# Patient Record
Sex: FEMALE | Race: WHITE | NOT HISPANIC OR LATINO | Employment: FULL TIME | URBAN - METROPOLITAN AREA
[De-identification: names, ages, dates, MRNs, and addresses within clinical notes are randomized per-mention and may not be internally consistent; named-entity substitution may affect disease eponyms.]

---

## 2023-04-26 LAB
ALANINE AMINOTRANSFERASE (SGPT) (U/L) IN SER/PLAS: 23 U/L (ref 7–45)
ALBUMIN (G/DL) IN SER/PLAS: 5.2 G/DL (ref 3.4–5)
ALKALINE PHOSPHATASE (U/L) IN SER/PLAS: 52 U/L (ref 33–110)
ANION GAP IN SER/PLAS: 13 MMOL/L (ref 10–20)
ASPARTATE AMINOTRANSFERASE (SGOT) (U/L) IN SER/PLAS: 16 U/L (ref 9–39)
BILIRUBIN TOTAL (MG/DL) IN SER/PLAS: 1 MG/DL (ref 0–1.2)
CALCIDIOL (25 OH VITAMIN D3) (NG/ML) IN SER/PLAS: 33 NG/ML
CALCIUM (MG/DL) IN SER/PLAS: 10.4 MG/DL (ref 8.6–10.6)
CARBON DIOXIDE, TOTAL (MMOL/L) IN SER/PLAS: 24 MMOL/L (ref 21–32)
CHLORIDE (MMOL/L) IN SER/PLAS: 104 MMOL/L (ref 98–107)
CHOLESTEROL (MG/DL) IN SER/PLAS: 182 MG/DL (ref 0–199)
CHOLESTEROL IN HDL (MG/DL) IN SER/PLAS: 66.4 MG/DL
CHOLESTEROL/HDL RATIO: 2.7
CREATININE (MG/DL) IN SER/PLAS: 0.49 MG/DL (ref 0.5–1.05)
GFR FEMALE: >90 ML/MIN/1.73M2
GLUCOSE (MG/DL) IN SER/PLAS: 77 MG/DL (ref 74–99)
LDL: 101 MG/DL (ref 0–119)
POTASSIUM (MMOL/L) IN SER/PLAS: 4.1 MMOL/L (ref 3.5–5.3)
PROTEIN TOTAL: 7.8 G/DL (ref 6.4–8.2)
SODIUM (MMOL/L) IN SER/PLAS: 137 MMOL/L (ref 136–145)
TRIGLYCERIDE (MG/DL) IN SER/PLAS: 72 MG/DL (ref 0–149)
UREA NITROGEN (MG/DL) IN SER/PLAS: 15 MG/DL (ref 6–23)
VLDL: 14 MG/DL (ref 0–40)

## 2023-08-29 LAB
ERYTHROCYTE DISTRIBUTION WIDTH (RATIO) BY AUTOMATED COUNT: 14.1 % (ref 11.5–14.5)
ERYTHROCYTE MEAN CORPUSCULAR HEMOGLOBIN CONCENTRATION (G/DL) BY AUTOMATED: 31.6 G/DL (ref 32–36)
ERYTHROCYTE MEAN CORPUSCULAR VOLUME (FL) BY AUTOMATED COUNT: 92 FL (ref 80–100)
ERYTHROCYTES (10*6/UL) IN BLOOD BY AUTOMATED COUNT: 4.35 X10E12/L (ref 4–5.2)
HEMATOCRIT (%) IN BLOOD BY AUTOMATED COUNT: 40.2 % (ref 36–46)
HEMOGLOBIN (G/DL) IN BLOOD: 12.7 G/DL (ref 12–16)
HEPATITIS B VIRUS SURFACE AG PRESENCE IN SERUM: NONREACTIVE
LEUKOCYTES (10*3/UL) IN BLOOD BY AUTOMATED COUNT: 7.7 X10E9/L (ref 4.4–11.3)
NRBC (PER 100 WBCS) BY AUTOMATED COUNT: 0 /100 WBC (ref 0–0)
PLATELETS (10*3/UL) IN BLOOD AUTOMATED COUNT: 254 X10E9/L (ref 150–450)
REFLEX ADDED, ANEMIA PANEL: ABNORMAL

## 2023-08-30 LAB
ABO GROUP (TYPE) IN BLOOD: NORMAL
ANTIBODY SCREEN: NORMAL
CHLAMYDIA TRACH., AMPLIFIED: NEGATIVE
HEPATITIS C VIRUS AB PRESENCE IN SERUM: NONREACTIVE
HIV 1/ 2 AG/AB SCREEN: NONREACTIVE
N. GONORRHEA, AMPLIFIED: NEGATIVE
RH FACTOR: NORMAL
RUBELLA VIRUS IGG AB: NEGATIVE
SYPHILIS TOTAL AB: NONREACTIVE
URINE CULTURE: NORMAL

## 2023-10-21 PROBLEM — N91.1 SECONDARY AMENORRHEA: Status: ACTIVE | Noted: 2023-10-21

## 2023-10-21 PROBLEM — Z34.90 PREGNANT (HHS-HCC): Status: ACTIVE | Noted: 2023-10-21

## 2023-10-21 PROBLEM — Z3A.12 12 WEEKS GESTATION OF PREGNANCY (HHS-HCC): Status: ACTIVE | Noted: 2023-10-21

## 2023-10-21 PROBLEM — L70.9 ACNE: Status: ACTIVE | Noted: 2023-10-21

## 2023-10-24 ENCOUNTER — ROUTINE PRENATAL (OUTPATIENT)
Dept: OBSTETRICS AND GYNECOLOGY | Facility: CLINIC | Age: 28
End: 2023-10-24
Payer: COMMERCIAL

## 2023-10-24 VITALS — DIASTOLIC BLOOD PRESSURE: 54 MMHG | BODY MASS INDEX: 19.46 KG/M2 | SYSTOLIC BLOOD PRESSURE: 102 MMHG | WEIGHT: 103 LBS

## 2023-10-24 DIAGNOSIS — Z3A.16 16 WEEKS GESTATION OF PREGNANCY (HHS-HCC): ICD-10-CM

## 2023-10-24 DIAGNOSIS — Z34.02 ENCOUNTER FOR SUPERVISION OF NORMAL FIRST PREGNANCY IN SECOND TRIMESTER (HHS-HCC): ICD-10-CM

## 2023-10-24 DIAGNOSIS — Z36.3 SCREENING, ANTENATAL, FOR MALFORMATION BY ULTRASOUND (HHS-HCC): ICD-10-CM

## 2023-10-24 PROCEDURE — 0501F PRENATAL FLOW SHEET: CPT | Performed by: OBSTETRICS & GYNECOLOGY

## 2023-11-21 ENCOUNTER — ROUTINE PRENATAL (OUTPATIENT)
Dept: OBSTETRICS AND GYNECOLOGY | Facility: CLINIC | Age: 28
End: 2023-11-21
Payer: COMMERCIAL

## 2023-11-21 ENCOUNTER — ANCILLARY PROCEDURE (OUTPATIENT)
Dept: RADIOLOGY | Facility: CLINIC | Age: 28
End: 2023-11-21
Payer: COMMERCIAL

## 2023-11-21 ENCOUNTER — APPOINTMENT (OUTPATIENT)
Dept: RADIOLOGY | Facility: CLINIC | Age: 28
End: 2023-11-21
Payer: COMMERCIAL

## 2023-11-21 VITALS — WEIGHT: 107 LBS | BODY MASS INDEX: 20.22 KG/M2 | SYSTOLIC BLOOD PRESSURE: 110 MMHG | DIASTOLIC BLOOD PRESSURE: 62 MMHG

## 2023-11-21 DIAGNOSIS — Z3A.20 20 WEEKS GESTATION OF PREGNANCY (HHS-HCC): ICD-10-CM

## 2023-11-21 DIAGNOSIS — Z36.3 SCREENING, ANTENATAL, FOR MALFORMATION BY ULTRASOUND (HHS-HCC): ICD-10-CM

## 2023-11-21 DIAGNOSIS — Z34.02 ENCOUNTER FOR SUPERVISION OF NORMAL FIRST PREGNANCY IN SECOND TRIMESTER (HHS-HCC): ICD-10-CM

## 2023-11-21 PROCEDURE — 0501F PRENATAL FLOW SHEET: CPT | Performed by: OBSTETRICS & GYNECOLOGY

## 2023-11-21 PROCEDURE — 76805 OB US >/= 14 WKS SNGL FETUS: CPT | Performed by: OBSTETRICS & GYNECOLOGY

## 2024-01-02 ENCOUNTER — ROUTINE PRENATAL (OUTPATIENT)
Dept: OBSTETRICS AND GYNECOLOGY | Facility: CLINIC | Age: 29
End: 2024-01-02
Payer: COMMERCIAL

## 2024-01-02 VITALS — BODY MASS INDEX: 22.11 KG/M2 | DIASTOLIC BLOOD PRESSURE: 54 MMHG | WEIGHT: 117 LBS | SYSTOLIC BLOOD PRESSURE: 92 MMHG

## 2024-01-02 DIAGNOSIS — Z34.02 ENCOUNTER FOR SUPERVISION OF NORMAL FIRST PREGNANCY IN SECOND TRIMESTER (HHS-HCC): ICD-10-CM

## 2024-01-02 DIAGNOSIS — Z3A.26 26 WEEKS GESTATION OF PREGNANCY (HHS-HCC): ICD-10-CM

## 2024-01-02 LAB
ERYTHROCYTE [DISTWIDTH] IN BLOOD BY AUTOMATED COUNT: 13.6 % (ref 11.5–14.5)
GLUCOSE 1H P 50 G GLC PO SERPL-MCNC: 96 MG/DL
HCT VFR BLD AUTO: 34.7 % (ref 36–46)
HGB BLD-MCNC: 11.4 G/DL (ref 12–16)
MCH RBC QN AUTO: 29.8 PG (ref 26–34)
MCHC RBC AUTO-ENTMCNC: 32.9 G/DL (ref 32–36)
MCV RBC AUTO: 91 FL (ref 80–100)
NRBC BLD-RTO: 0 /100 WBCS (ref 0–0)
PLATELET # BLD AUTO: 227 X10*3/UL (ref 150–450)
RBC # BLD AUTO: 3.82 X10*6/UL (ref 4–5.2)
WBC # BLD AUTO: 9.9 X10*3/UL (ref 4.4–11.3)

## 2024-01-02 PROCEDURE — 85027 COMPLETE CBC AUTOMATED: CPT

## 2024-01-02 PROCEDURE — 82947 ASSAY GLUCOSE BLOOD QUANT: CPT

## 2024-01-02 PROCEDURE — 0501F PRENATAL FLOW SHEET: CPT | Performed by: OBSTETRICS & GYNECOLOGY

## 2024-01-02 PROCEDURE — 36415 COLL VENOUS BLD VENIPUNCTURE: CPT

## 2024-01-30 ENCOUNTER — ROUTINE PRENATAL (OUTPATIENT)
Dept: OBSTETRICS AND GYNECOLOGY | Facility: CLINIC | Age: 29
End: 2024-01-30
Payer: COMMERCIAL

## 2024-01-30 VITALS — WEIGHT: 123 LBS | BODY MASS INDEX: 23.24 KG/M2 | DIASTOLIC BLOOD PRESSURE: 48 MMHG | SYSTOLIC BLOOD PRESSURE: 92 MMHG

## 2024-01-30 DIAGNOSIS — Z3A.30 30 WEEKS GESTATION OF PREGNANCY (HHS-HCC): ICD-10-CM

## 2024-01-30 DIAGNOSIS — Z34.03 ENCOUNTER FOR SUPERVISION OF NORMAL FIRST PREGNANCY IN THIRD TRIMESTER (HHS-HCC): ICD-10-CM

## 2024-01-30 PROCEDURE — 0501F PRENATAL FLOW SHEET: CPT | Performed by: OBSTETRICS & GYNECOLOGY

## 2024-02-13 ENCOUNTER — ROUTINE PRENATAL (OUTPATIENT)
Dept: OBSTETRICS AND GYNECOLOGY | Facility: CLINIC | Age: 29
End: 2024-02-13
Payer: COMMERCIAL

## 2024-02-13 VITALS — BODY MASS INDEX: 24 KG/M2 | DIASTOLIC BLOOD PRESSURE: 64 MMHG | WEIGHT: 127 LBS | SYSTOLIC BLOOD PRESSURE: 98 MMHG

## 2024-02-13 DIAGNOSIS — Z23 NEED FOR TDAP VACCINATION: ICD-10-CM

## 2024-02-13 DIAGNOSIS — Z3A.32 32 WEEKS GESTATION OF PREGNANCY (HHS-HCC): ICD-10-CM

## 2024-02-13 DIAGNOSIS — Z34.03 ENCOUNTER FOR SUPERVISION OF NORMAL FIRST PREGNANCY IN THIRD TRIMESTER (HHS-HCC): ICD-10-CM

## 2024-02-13 PROCEDURE — 90471 IMMUNIZATION ADMIN: CPT | Performed by: OBSTETRICS & GYNECOLOGY

## 2024-02-13 PROCEDURE — 90715 TDAP VACCINE 7 YRS/> IM: CPT | Performed by: OBSTETRICS & GYNECOLOGY

## 2024-02-13 PROCEDURE — 0501F PRENATAL FLOW SHEET: CPT | Performed by: OBSTETRICS & GYNECOLOGY

## 2024-02-13 NOTE — PROGRESS NOTES
TDAP IM Right Deltoid  Lot  # [id number]  Exp: 2/6/26  NDC: 41375-524-44    Feels well.  Baby active.  FMLA forms rec'd today.  Disc ELG  
pedal edema

## 2024-02-28 ENCOUNTER — HOSPITAL ENCOUNTER (OUTPATIENT)
Dept: RADIOLOGY | Facility: CLINIC | Age: 29
Discharge: HOME | End: 2024-02-28
Payer: COMMERCIAL

## 2024-02-28 ENCOUNTER — ROUTINE PRENATAL (OUTPATIENT)
Dept: OBSTETRICS AND GYNECOLOGY | Facility: CLINIC | Age: 29
End: 2024-02-28
Payer: COMMERCIAL

## 2024-02-28 VITALS — BODY MASS INDEX: 24.19 KG/M2 | SYSTOLIC BLOOD PRESSURE: 100 MMHG | WEIGHT: 128 LBS | DIASTOLIC BLOOD PRESSURE: 70 MMHG

## 2024-02-28 DIAGNOSIS — Z3A.35 35 WEEKS GESTATION OF PREGNANCY (HHS-HCC): Primary | ICD-10-CM

## 2024-02-28 DIAGNOSIS — Z3A.30 30 WEEKS GESTATION OF PREGNANCY (HHS-HCC): ICD-10-CM

## 2024-02-28 PROCEDURE — 76815 OB US LIMITED FETUS(S): CPT | Performed by: OBSTETRICS & GYNECOLOGY

## 2024-02-28 PROCEDURE — 0501F PRENATAL FLOW SHEET: CPT | Performed by: OBSTETRICS & GYNECOLOGY

## 2024-03-08 ENCOUNTER — ROUTINE PRENATAL (OUTPATIENT)
Dept: OBSTETRICS AND GYNECOLOGY | Facility: CLINIC | Age: 29
End: 2024-03-08
Payer: COMMERCIAL

## 2024-03-08 VITALS — SYSTOLIC BLOOD PRESSURE: 110 MMHG | DIASTOLIC BLOOD PRESSURE: 74 MMHG | BODY MASS INDEX: 24.37 KG/M2 | WEIGHT: 129 LBS

## 2024-03-08 DIAGNOSIS — Z34.03 ENCOUNTER FOR SUPERVISION OF NORMAL FIRST PREGNANCY IN THIRD TRIMESTER (HHS-HCC): ICD-10-CM

## 2024-03-08 DIAGNOSIS — Z3A.36 36 WEEKS GESTATION OF PREGNANCY (HHS-HCC): ICD-10-CM

## 2024-03-08 PROCEDURE — 87081 CULTURE SCREEN ONLY: CPT

## 2024-03-08 PROCEDURE — 0501F PRENATAL FLOW SHEET: CPT | Performed by: OBSTETRICS & GYNECOLOGY

## 2024-03-11 LAB — GP B STREP GENITAL QL CULT: NORMAL

## 2024-03-13 ENCOUNTER — ROUTINE PRENATAL (OUTPATIENT)
Dept: OBSTETRICS AND GYNECOLOGY | Facility: CLINIC | Age: 29
End: 2024-03-13
Payer: COMMERCIAL

## 2024-03-13 VITALS — SYSTOLIC BLOOD PRESSURE: 100 MMHG | BODY MASS INDEX: 24.37 KG/M2 | DIASTOLIC BLOOD PRESSURE: 60 MMHG | WEIGHT: 129 LBS

## 2024-03-13 DIAGNOSIS — Z34.03 PREGNANCY, FIRST, THIRD TRIMESTER (HHS-HCC): ICD-10-CM

## 2024-03-13 PROCEDURE — 0501F PRENATAL FLOW SHEET: CPT | Performed by: OBSTETRICS & GYNECOLOGY

## 2024-03-19 ENCOUNTER — ROUTINE PRENATAL (OUTPATIENT)
Dept: OBSTETRICS AND GYNECOLOGY | Facility: CLINIC | Age: 29
End: 2024-03-19
Payer: COMMERCIAL

## 2024-03-19 VITALS — SYSTOLIC BLOOD PRESSURE: 98 MMHG | DIASTOLIC BLOOD PRESSURE: 62 MMHG | BODY MASS INDEX: 24.37 KG/M2 | WEIGHT: 129 LBS

## 2024-03-19 DIAGNOSIS — Z34.03 ENCOUNTER FOR SUPERVISION OF NORMAL FIRST PREGNANCY IN THIRD TRIMESTER (HHS-HCC): ICD-10-CM

## 2024-03-19 DIAGNOSIS — Z3A.37 37 WEEKS GESTATION OF PREGNANCY (HHS-HCC): ICD-10-CM

## 2024-03-19 PROCEDURE — 59426 ANTEPARTUM CARE ONLY: CPT | Performed by: OBSTETRICS & GYNECOLOGY

## 2024-03-20 ENCOUNTER — TELEPHONE (OUTPATIENT)
Dept: OBSTETRICS AND GYNECOLOGY | Facility: CLINIC | Age: 29
End: 2024-03-20
Payer: COMMERCIAL

## 2024-03-20 NOTE — TELEPHONE ENCOUNTER
"Spoke w pt.  \"Sticky\" discharge followed by mucous plug.  No watery discharge.  Disc and to go to L&D if recurrent episodes of watery discharge  "

## 2024-03-20 NOTE — TELEPHONE ENCOUNTER
Patient called she is 38 weeks pregnant and felt a trickle of fluid  Today at work and when she went to the bathroom she passed   A white clot about the size of a daly and it has an odor, after that   She did not have any more trickles please advise.

## 2024-03-24 ENCOUNTER — HOSPITAL ENCOUNTER (INPATIENT)
Facility: HOSPITAL | Age: 29
LOS: 2 days | Discharge: HOME | End: 2024-03-26
Attending: OBSTETRICS & GYNECOLOGY | Admitting: MIDWIFE
Payer: COMMERCIAL

## 2024-03-24 ENCOUNTER — ANESTHESIA (OUTPATIENT)
Dept: OBSTETRICS AND GYNECOLOGY | Facility: HOSPITAL | Age: 29
End: 2024-03-24
Payer: COMMERCIAL

## 2024-03-24 ENCOUNTER — ANESTHESIA EVENT (OUTPATIENT)
Dept: OBSTETRICS AND GYNECOLOGY | Facility: HOSPITAL | Age: 29
End: 2024-03-24
Payer: COMMERCIAL

## 2024-03-24 PROBLEM — Z3A.38 38 WEEKS GESTATION OF PREGNANCY (HHS-HCC): Status: ACTIVE | Noted: 2024-03-24

## 2024-03-24 PROBLEM — L70.9 ACNE: Status: RESOLVED | Noted: 2023-10-21 | Resolved: 2024-03-24

## 2024-03-24 PROBLEM — Z3A.38 38 WEEKS GESTATION OF PREGNANCY (HHS-HCC): Status: RESOLVED | Noted: 2024-03-24 | Resolved: 2024-03-24

## 2024-03-24 PROBLEM — Z34.03 PRIMIGRAVIDA IN THIRD TRIMESTER (HHS-HCC): Status: RESOLVED | Noted: 2024-03-24 | Resolved: 2024-03-24

## 2024-03-24 PROBLEM — Z34.03 PRIMIGRAVIDA IN THIRD TRIMESTER (HHS-HCC): Status: ACTIVE | Noted: 2024-03-24

## 2024-03-24 PROBLEM — Z34.00 ENCOUNTER FOR SUPERVISION OF NORMAL INTRAUTERINE PREGNANCY IN PRIMIGRAVIDA, ANTEPARTUM (HHS-HCC): Status: ACTIVE | Noted: 2024-03-24

## 2024-03-24 PROBLEM — Z34.90 PREGNANT (HHS-HCC): Status: RESOLVED | Noted: 2023-10-21 | Resolved: 2024-03-24

## 2024-03-24 PROBLEM — Z34.00 ENCOUNTER FOR SUPERVISION OF NORMAL INTRAUTERINE PREGNANCY IN PRIMIGRAVIDA, ANTEPARTUM (HHS-HCC): Status: RESOLVED | Noted: 2024-03-24 | Resolved: 2024-03-24

## 2024-03-24 PROBLEM — N91.1 SECONDARY AMENORRHEA: Status: RESOLVED | Noted: 2023-10-21 | Resolved: 2024-03-24

## 2024-03-24 PROBLEM — Z3A.12 12 WEEKS GESTATION OF PREGNANCY (HHS-HCC): Status: RESOLVED | Noted: 2023-10-21 | Resolved: 2024-03-24

## 2024-03-24 LAB
ABO GROUP (TYPE) IN BLOOD: NORMAL
ANTIBODY SCREEN: NORMAL
ERYTHROCYTE [DISTWIDTH] IN BLOOD BY AUTOMATED COUNT: 13.3 % (ref 11.5–14.5)
HCT VFR BLD AUTO: 36.8 % (ref 36–46)
HGB BLD-MCNC: 12.4 G/DL (ref 12–16)
MCH RBC QN AUTO: 29.7 PG (ref 26–34)
MCHC RBC AUTO-ENTMCNC: 33.7 G/DL (ref 32–36)
MCV RBC AUTO: 88 FL (ref 80–100)
NRBC BLD-RTO: 0 /100 WBCS (ref 0–0)
PLATELET # BLD AUTO: 194 X10*3/UL (ref 150–450)
RBC # BLD AUTO: 4.17 X10*6/UL (ref 4–5.2)
RH FACTOR (ANTIGEN D): NORMAL
TREPONEMA PALLIDUM IGG+IGM AB [PRESENCE] IN SERUM OR PLASMA BY IMMUNOASSAY: NONREACTIVE
WBC # BLD AUTO: 9.7 X10*3/UL (ref 4.4–11.3)

## 2024-03-24 PROCEDURE — 51701 INSERT BLADDER CATHETER: CPT

## 2024-03-24 PROCEDURE — 2500000005 HC RX 250 GENERAL PHARMACY W/O HCPCS: Performed by: NURSE ANESTHETIST, CERTIFIED REGISTERED

## 2024-03-24 PROCEDURE — 59409 OBSTETRICAL CARE: CPT | Performed by: ADVANCED PRACTICE MIDWIFE

## 2024-03-24 PROCEDURE — 0DQR0ZZ REPAIR ANAL SPHINCTER, OPEN APPROACH: ICD-10-PCS | Performed by: OBSTETRICS & GYNECOLOGY

## 2024-03-24 PROCEDURE — 88307 TISSUE EXAM BY PATHOLOGIST: CPT | Performed by: PATHOLOGY

## 2024-03-24 PROCEDURE — 2500000004 HC RX 250 GENERAL PHARMACY W/ HCPCS (ALT 636 FOR OP/ED): Performed by: MIDWIFE

## 2024-03-24 PROCEDURE — 7210000002 HC LABOR PER HOUR

## 2024-03-24 PROCEDURE — 85027 COMPLETE CBC AUTOMATED: CPT | Performed by: MIDWIFE

## 2024-03-24 PROCEDURE — 36415 COLL VENOUS BLD VENIPUNCTURE: CPT | Performed by: MIDWIFE

## 2024-03-24 PROCEDURE — 2500000004 HC RX 250 GENERAL PHARMACY W/ HCPCS (ALT 636 FOR OP/ED): Performed by: STUDENT IN AN ORGANIZED HEALTH CARE EDUCATION/TRAINING PROGRAM

## 2024-03-24 PROCEDURE — 88307 TISSUE EXAM BY PATHOLOGIST: CPT | Mod: TC,AHULAB | Performed by: ADVANCED PRACTICE MIDWIFE

## 2024-03-24 PROCEDURE — 86780 TREPONEMA PALLIDUM: CPT | Mod: AHULAB | Performed by: MIDWIFE

## 2024-03-24 PROCEDURE — 1120000001 HC OB PRIVATE ROOM DAILY

## 2024-03-24 PROCEDURE — 59050 FETAL MONITOR W/REPORT: CPT

## 2024-03-24 PROCEDURE — 86901 BLOOD TYPING SEROLOGIC RH(D): CPT | Performed by: MIDWIFE

## 2024-03-24 PROCEDURE — 01967 NEURAXL LBR ANES VAG DLVR: CPT | Performed by: NURSE ANESTHETIST, CERTIFIED REGISTERED

## 2024-03-24 RX ORDER — METHYLERGONOVINE MALEATE 0.2 MG/ML
0.2 INJECTION INTRAVENOUS ONCE AS NEEDED
Status: COMPLETED | OUTPATIENT
Start: 2024-03-24 | End: 2024-03-24

## 2024-03-24 RX ORDER — ACETAMINOPHEN 325 MG/1
975 TABLET ORAL EVERY 6 HOURS
Status: DISCONTINUED | OUTPATIENT
Start: 2024-03-24 | End: 2024-03-26 | Stop reason: HOSPADM

## 2024-03-24 RX ORDER — OXYTOCIN/0.9 % SODIUM CHLORIDE 30/500 ML
60 PLASTIC BAG, INJECTION (ML) INTRAVENOUS ONCE AS NEEDED
Status: DISCONTINUED | OUTPATIENT
Start: 2024-03-24 | End: 2024-03-26 | Stop reason: HOSPADM

## 2024-03-24 RX ORDER — MISOPROSTOL 200 UG/1
800 TABLET ORAL ONCE AS NEEDED
Status: DISCONTINUED | OUTPATIENT
Start: 2024-03-24 | End: 2024-03-26 | Stop reason: HOSPADM

## 2024-03-24 RX ORDER — CARBOPROST TROMETHAMINE 250 UG/ML
250 INJECTION, SOLUTION INTRAMUSCULAR ONCE AS NEEDED
Status: DISCONTINUED | OUTPATIENT
Start: 2024-03-24 | End: 2024-03-24

## 2024-03-24 RX ORDER — SODIUM CHLORIDE, SODIUM LACTATE, POTASSIUM CHLORIDE, CALCIUM CHLORIDE 600; 310; 30; 20 MG/100ML; MG/100ML; MG/100ML; MG/100ML
125 INJECTION, SOLUTION INTRAVENOUS CONTINUOUS
Status: DISCONTINUED | OUTPATIENT
Start: 2024-03-24 | End: 2024-03-24

## 2024-03-24 RX ORDER — ADHESIVE BANDAGE
10 BANDAGE TOPICAL
Status: DISCONTINUED | OUTPATIENT
Start: 2024-03-24 | End: 2024-03-26 | Stop reason: HOSPADM

## 2024-03-24 RX ORDER — OXYTOCIN 10 [USP'U]/ML
10 INJECTION, SOLUTION INTRAMUSCULAR; INTRAVENOUS ONCE AS NEEDED
Status: DISCONTINUED | OUTPATIENT
Start: 2024-03-24 | End: 2024-03-24

## 2024-03-24 RX ORDER — OXYTOCIN 10 [USP'U]/ML
10 INJECTION, SOLUTION INTRAMUSCULAR; INTRAVENOUS ONCE AS NEEDED
Status: DISCONTINUED | OUTPATIENT
Start: 2024-03-24 | End: 2024-03-26 | Stop reason: HOSPADM

## 2024-03-24 RX ORDER — METOCLOPRAMIDE 10 MG/1
10 TABLET ORAL EVERY 6 HOURS PRN
Status: DISCONTINUED | OUTPATIENT
Start: 2024-03-24 | End: 2024-03-24

## 2024-03-24 RX ORDER — NIFEDIPINE 10 MG/1
10 CAPSULE ORAL ONCE AS NEEDED
Status: DISCONTINUED | OUTPATIENT
Start: 2024-03-24 | End: 2024-03-24

## 2024-03-24 RX ORDER — ONDANSETRON HYDROCHLORIDE 2 MG/ML
4 INJECTION, SOLUTION INTRAVENOUS EVERY 6 HOURS PRN
Status: DISCONTINUED | OUTPATIENT
Start: 2024-03-24 | End: 2024-03-24

## 2024-03-24 RX ORDER — TRANEXAMIC ACID 100 MG/ML
1000 INJECTION, SOLUTION INTRAVENOUS ONCE AS NEEDED
Status: DISCONTINUED | OUTPATIENT
Start: 2024-03-24 | End: 2024-03-24

## 2024-03-24 RX ORDER — HYDRALAZINE HYDROCHLORIDE 20 MG/ML
5 INJECTION INTRAMUSCULAR; INTRAVENOUS ONCE AS NEEDED
Status: DISCONTINUED | OUTPATIENT
Start: 2024-03-24 | End: 2024-03-24

## 2024-03-24 RX ORDER — LIDOCAINE HCL/EPINEPHRINE/PF 2%-1:200K
VIAL (ML) INJECTION AS NEEDED
Status: DISCONTINUED | OUTPATIENT
Start: 2024-03-24 | End: 2024-03-24

## 2024-03-24 RX ORDER — NIFEDIPINE 10 MG/1
10 CAPSULE ORAL ONCE AS NEEDED
Status: DISCONTINUED | OUTPATIENT
Start: 2024-03-24 | End: 2024-03-26 | Stop reason: HOSPADM

## 2024-03-24 RX ORDER — LABETALOL HYDROCHLORIDE 5 MG/ML
20 INJECTION, SOLUTION INTRAVENOUS ONCE AS NEEDED
Status: DISCONTINUED | OUTPATIENT
Start: 2024-03-24 | End: 2024-03-24

## 2024-03-24 RX ORDER — DIPHENHYDRAMINE HCL 25 MG
25 CAPSULE ORAL EVERY 6 HOURS PRN
Status: DISCONTINUED | OUTPATIENT
Start: 2024-03-24 | End: 2024-03-26 | Stop reason: HOSPADM

## 2024-03-24 RX ORDER — METOCLOPRAMIDE HYDROCHLORIDE 5 MG/ML
10 INJECTION INTRAMUSCULAR; INTRAVENOUS EVERY 6 HOURS PRN
Status: DISCONTINUED | OUTPATIENT
Start: 2024-03-24 | End: 2024-03-24

## 2024-03-24 RX ORDER — FENTANYL/ROPIVACAINE/NS/PF 2MCG/ML-.2
0-25 PLASTIC BAG, INJECTION (ML) INJECTION CONTINUOUS
Status: DISCONTINUED | OUTPATIENT
Start: 2024-03-24 | End: 2024-03-24

## 2024-03-24 RX ORDER — LIDOCAINE HYDROCHLORIDE 10 MG/ML
30 INJECTION INFILTRATION; PERINEURAL ONCE AS NEEDED
Status: DISCONTINUED | OUTPATIENT
Start: 2024-03-24 | End: 2024-03-24

## 2024-03-24 RX ORDER — ONDANSETRON HYDROCHLORIDE 2 MG/ML
4 INJECTION, SOLUTION INTRAVENOUS EVERY 6 HOURS PRN
Status: DISCONTINUED | OUTPATIENT
Start: 2024-03-24 | End: 2024-03-26 | Stop reason: HOSPADM

## 2024-03-24 RX ORDER — CARBOPROST TROMETHAMINE 250 UG/ML
250 INJECTION, SOLUTION INTRAMUSCULAR ONCE AS NEEDED
Status: DISCONTINUED | OUTPATIENT
Start: 2024-03-24 | End: 2024-03-26 | Stop reason: HOSPADM

## 2024-03-24 RX ORDER — TERBUTALINE SULFATE 1 MG/ML
0.25 INJECTION SUBCUTANEOUS ONCE AS NEEDED
Status: DISCONTINUED | OUTPATIENT
Start: 2024-03-24 | End: 2024-03-24

## 2024-03-24 RX ORDER — POLYETHYLENE GLYCOL 3350 17 G/17G
17 POWDER, FOR SOLUTION ORAL 2 TIMES DAILY PRN
Status: DISCONTINUED | OUTPATIENT
Start: 2024-03-24 | End: 2024-03-26 | Stop reason: HOSPADM

## 2024-03-24 RX ORDER — LOPERAMIDE HYDROCHLORIDE 2 MG/1
4 CAPSULE ORAL EVERY 2 HOUR PRN
Status: DISCONTINUED | OUTPATIENT
Start: 2024-03-24 | End: 2024-03-24

## 2024-03-24 RX ORDER — MISOPROSTOL 200 UG/1
800 TABLET ORAL ONCE AS NEEDED
Status: DISCONTINUED | OUTPATIENT
Start: 2024-03-24 | End: 2024-03-24

## 2024-03-24 RX ORDER — BISACODYL 10 MG/1
10 SUPPOSITORY RECTAL DAILY PRN
Status: DISCONTINUED | OUTPATIENT
Start: 2024-03-24 | End: 2024-03-26 | Stop reason: HOSPADM

## 2024-03-24 RX ORDER — HYDRALAZINE HYDROCHLORIDE 20 MG/ML
5 INJECTION INTRAMUSCULAR; INTRAVENOUS ONCE AS NEEDED
Status: DISCONTINUED | OUTPATIENT
Start: 2024-03-24 | End: 2024-03-26 | Stop reason: HOSPADM

## 2024-03-24 RX ORDER — ONDANSETRON 4 MG/1
4 TABLET, FILM COATED ORAL EVERY 6 HOURS PRN
Status: DISCONTINUED | OUTPATIENT
Start: 2024-03-24 | End: 2024-03-26 | Stop reason: HOSPADM

## 2024-03-24 RX ORDER — ONDANSETRON 4 MG/1
4 TABLET, FILM COATED ORAL EVERY 6 HOURS PRN
Status: DISCONTINUED | OUTPATIENT
Start: 2024-03-24 | End: 2024-03-24

## 2024-03-24 RX ORDER — DIPHENHYDRAMINE HYDROCHLORIDE 50 MG/ML
25 INJECTION INTRAMUSCULAR; INTRAVENOUS EVERY 6 HOURS PRN
Status: DISCONTINUED | OUTPATIENT
Start: 2024-03-24 | End: 2024-03-26 | Stop reason: HOSPADM

## 2024-03-24 RX ORDER — LIDOCAINE 560 MG/1
1 PATCH PERCUTANEOUS; TOPICAL; TRANSDERMAL
Status: DISCONTINUED | OUTPATIENT
Start: 2024-03-24 | End: 2024-03-26 | Stop reason: HOSPADM

## 2024-03-24 RX ORDER — LOPERAMIDE HYDROCHLORIDE 2 MG/1
4 CAPSULE ORAL EVERY 2 HOUR PRN
Status: DISCONTINUED | OUTPATIENT
Start: 2024-03-24 | End: 2024-03-26 | Stop reason: HOSPADM

## 2024-03-24 RX ORDER — METHYLERGONOVINE MALEATE 0.2 MG/ML
0.2 INJECTION INTRAVENOUS ONCE AS NEEDED
Status: DISCONTINUED | OUTPATIENT
Start: 2024-03-24 | End: 2024-03-26 | Stop reason: HOSPADM

## 2024-03-24 RX ORDER — TRANEXAMIC ACID 100 MG/ML
1000 INJECTION, SOLUTION INTRAVENOUS ONCE AS NEEDED
Status: DISCONTINUED | OUTPATIENT
Start: 2024-03-24 | End: 2024-03-26 | Stop reason: HOSPADM

## 2024-03-24 RX ORDER — IBUPROFEN 600 MG/1
600 TABLET ORAL EVERY 6 HOURS
Status: DISCONTINUED | OUTPATIENT
Start: 2024-03-24 | End: 2024-03-26 | Stop reason: HOSPADM

## 2024-03-24 RX ORDER — LABETALOL HYDROCHLORIDE 5 MG/ML
20 INJECTION, SOLUTION INTRAVENOUS ONCE AS NEEDED
Status: DISCONTINUED | OUTPATIENT
Start: 2024-03-24 | End: 2024-03-26 | Stop reason: HOSPADM

## 2024-03-24 RX ORDER — SIMETHICONE 80 MG
80 TABLET,CHEWABLE ORAL 4 TIMES DAILY PRN
Status: DISCONTINUED | OUTPATIENT
Start: 2024-03-24 | End: 2024-03-26 | Stop reason: HOSPADM

## 2024-03-24 RX ADMIN — Medication 4 ML: at 09:30

## 2024-03-24 RX ADMIN — METHYLERGONOVINE MALEATE 0.2 MG: 0.2 INJECTION, SOLUTION INTRAMUSCULAR; INTRAVENOUS at 21:25

## 2024-03-24 RX ADMIN — Medication 10 ML/HR: at 09:40

## 2024-03-24 RX ADMIN — SODIUM CHLORIDE, POTASSIUM CHLORIDE, SODIUM LACTATE AND CALCIUM CHLORIDE 500 ML: 600; 310; 30; 20 INJECTION, SOLUTION INTRAVENOUS at 09:48

## 2024-03-24 RX ADMIN — LIDOCAINE HYDROCHLORIDE,EPINEPHRINE BITARTRATE 3 ML: 20; .005 INJECTION, SOLUTION EPIDURAL; INFILTRATION; INTRACAUDAL; PERINEURAL at 09:26

## 2024-03-24 RX ADMIN — Medication 4 ML: at 09:37

## 2024-03-24 RX ADMIN — SODIUM CHLORIDE, POTASSIUM CHLORIDE, SODIUM LACTATE AND CALCIUM CHLORIDE 125 ML/HR: 600; 310; 30; 20 INJECTION, SOLUTION INTRAVENOUS at 09:49

## 2024-03-24 SDOH — HEALTH STABILITY: MENTAL HEALTH: CURRENT SMOKER: 0

## 2024-03-24 ASSESSMENT — PAIN SCALES - GENERAL
PAINLEVEL_OUTOF10: 0 - NO PAIN
PAINLEVEL_OUTOF10: 4
PAINLEVEL_OUTOF10: 0 - NO PAIN
PAINLEVEL_OUTOF10: 0 - NO PAIN
PAINLEVEL_OUTOF10: 3
PAINLEVEL_OUTOF10: 0 - NO PAIN
PAINLEVEL_OUTOF10: 0 - NO PAIN
PAINLEVEL_OUTOF10: 4

## 2024-03-24 NOTE — PROGRESS NOTES
Intrapartum Progress Note    Assessment/Plan   Avelina Moulton is a 28 y.o.  at 38w4d. MEI: 4/3/2024, by Last Menstrual Period.      Rest x 1 hour, then peanut ball changing positions q45   min   Recheck 4 hours or prn    Principal Problem:    Encounter for supervision of normal intrauterine pregnancy in primigravida, antepartum    Pregnancy Problems (from 10/24/23 to present)       Problem Noted Resolved    Encounter for supervision of normal intrauterine pregnancy in primigravida, antepartum 3/24/2024 by Chen Nath, JORGE-CHEKO No    Priority:  Medium              Subjective   Avelina just received her epidural and has good relief.  She reports being tired and would like a hour nap.  She agrees to VE and also to move with the peanut ball every 45 minutes after napping for 1 hour.    Objective   Last Vitals:  Temp Pulse Resp BP MAP Pulse Ox   36.4 °C (97.5 °F) 86 19 111/70   98 %     Vitals Min/Max Last 24 Hours:  Temp  Min: 36.3 °C (97.3 °F)  Max: 37 °C (98.6 °F)  Pulse  Min: 73  Max: 118  Resp  Min: 19  Max: 20  BP  Min: 104/61  Max: 118/66    Intake/Output:  No intake or output data in the 24 hours ending 24 1012    Physical Examination:  GENERAL: Examination reveals a well developed, well nourished, gravid female in no acute distress. She is alert and cooperative and is tired .  FHR is 140 Mod variability , with POS Accelerations, No decelerations and a Category I tracing.    [place] readin-3/60/+1  VAGINA: normal appearing vagina with normal color and discharge and no lesions noted  CERVIX: 4 cm dilated, 100 % effaced, -1 station; MEMBRANES are SROM.  Asynclitic  PSYCHOLOGICAL: awake and alert; oriented to person, place, and time    Lab Review:  Labs in chart were reviewed.

## 2024-03-24 NOTE — PROGRESS NOTES
Intrapartum Progress Note    Assessment/Plan   Avelina Moulton is a 28 y.o.  at 38w4d. MEI: 4/3/2024, by Last Menstrual Period.      Recheck 4 hours   Continue to reposition   Anticipate SVB    Principal Problem:    Encounter for supervision of normal intrauterine pregnancy in primigravida, antepartum    Pregnancy Problems (from 10/24/23 to present)       Problem Noted Resolved    Encounter for supervision of normal intrauterine pregnancy in primigravida, antepartum 3/24/2024 by ELVIA Pritchard No    Priority:  Medium              Subjective   Avelina is sitting up in thrown.  Having some right groin pain.  Agrees to SVE    Objective   Last Vitals:  Temp Pulse Resp BP MAP Pulse Ox   36.9 °C (98.4 °F) 89 18 114/70   100 %     Vitals Min/Max Last 24 Hours:  Temp  Min: 36.1 °C (97 °F)  Max: 37 °C (98.6 °F)  Pulse  Min: 73  Max: 118  Resp  Min: 18  Max: 20  BP  Min: 100/58  Max: 118/66    Intake/Output:    Intake/Output Summary (Last 24 hours) at 3/24/2024 1420  Last data filed at 3/24/2024 1324  Gross per 24 hour   Intake 485.25 ml   Output 350 ml   Net 135.25 ml       Physical Examination:  GENERAL: Examination reveals a well developed, well nourished, gravid female in no acute distress. She is alert and cooperative.  FHR is  , with Accelerations, and a Category I tracing.    [place] reading:    CERVIX: 5 cm dilated, 100 % effaced, 0 station; MEMBRANES are SROM.  Vertex now synclitic    Lab Review:  Labs in chart were reviewed.

## 2024-03-24 NOTE — PROGRESS NOTES
Intrapartum Progress Note    Assessment   Avelina Moulton is a 28 y.o.  at 38w4d. MEI: 4/3/2024, by Last Menstrual Period.   FHT Category 1  Second stage of labor    Plan   Encourage frequent position changes as tolerated  Continue assessment of maternal and fetal wellbeing  Recheck as clinically indicated by maternal or fetal status  Anticipate NSVB  Throne position to labor descent      Subjective   Coping well with labor, feeling pressure  Family at bedside for support    Pregnancy complicated by:  Pregnancy Problems (from 10/24/23 to present)       Problem Noted Resolved    Encounter for supervision of normal intrauterine pregnancy in primigravida, antepartum 3/24/2024 by ELVIA Pritchard No    Priority:  Medium      38 weeks gestation of pregnancy 3/24/2024 by ELVIA Johnson No    Priority:  Medium      Primigravida in third trimester 3/24/2024 by ELVIA Johnson No    Priority:  Medium            Principal Problem:    Encounter for supervision of normal intrauterine pregnancy in primigravida, antepartum  Active Problems:    38 weeks gestation of pregnancy    Primigravida in third trimester      Objective   Last Vitals:  Temp Pulse Resp BP MAP Pulse Ox   37.9 °C (100.2 °F) (ENOC Collado CNM aware of temp. Flluid bolus initiated) (!) 130 18 105/57   96 %     Vitals Min/Max Last 24 Hours:  Temp  Min: 36.1 °C (97 °F)  Max: 37.9 °C (100.2 °F)  Pulse  Min: 73  Max: 130  Resp  Min: 18  Max: 20  BP  Min: 98/55  Max: 140/70    Intake/Output:    Intake/Output Summary (Last 24 hours) at 3/24/2024 1933  Last data filed at 3/24/2024 1833  Gross per 24 hour   Intake 2337.58 ml   Output 550 ml   Net 1787.58 ml       Physical Examination:  CERVIX:  10/100/+1 ; MEMBRANES are SROM    Fetal Monitoring      Baseline FHR: 150 per minute  Variability: moderate  Accelerations: yes  Decelerations: variable non persistent  TOCO: regular, every 2 minutes    Lab Review:  Lab Results   Component Value  Date    ABO B 03/24/2024    LABRH POS 03/24/2024    ABSCRN NEG 03/24/2024     Lab Results   Component Value Date    WBC 9.7 03/24/2024    HGB 12.4 03/24/2024    HCT 36.8 03/24/2024     03/24/2024

## 2024-03-24 NOTE — PROGRESS NOTES
Intrapartum Progress Note    Assessment/Plan   Avelina Moulton is a 28 y.o.  at 38w4d. MEI: 4/3/2024, by Last Menstrual Period.      Recheck once out of the tub   Pain management per patient request   Anticipate SVB    Team rounded with Dr. Nix    Principal Problem:    Encounter for supervision of normal intrauterine pregnancy in primigravida, antepartum    Pregnancy Problems (from 10/24/23 to present)       Problem Noted Resolved    Encounter for supervision of normal intrauterine pregnancy in primigravida, antepartum 3/24/2024 by JORGE Black-CHEKO No    Priority:  Medium              Subjective   vAelina is feeling more intense contractions and is in the tub.  Family supportive at the bedside.  She is considering an epidural.    Objective   Last Vitals:  Temp Pulse Resp BP MAP Pulse Ox   36.6 °C (97.9 °F) 82 20 104/61   100 %     Vitals Min/Max Last 24 Hours:  Temp  Min: 36.4 °C (97.5 °F)  Max: 37 °C (98.6 °F)  Pulse  Min: 73  Max: 118  Resp  Min: 20  Max: 20  BP  Min: 104/61  Max: 115/68    Intake/Output:  No intake or output data in the 24 hours ending 24 0725    Physical Examination:  GENERAL: Examination reveals a well developed, well nourished, gravid female in no acute distress and is reporting fatigue . She is alert and cooperative.  FHR is 130 via doppler.  The nurse denies audible decelerations  [place] reading: per patient q4     Lab Review:  Labs in chart were reviewed.

## 2024-03-24 NOTE — ANESTHESIA PREPROCEDURE EVALUATION
Patient: Avelina Moulton    Evaluation Method: In-person visit    Procedure Information    Date: 24  Procedure: Labor Consult        28 y.o.  at 38w4d SROM, labor. Would like to proceed with epidural      Relevant Problems   Anesthesia (within normal limits)      Cardiovascular (within normal limits)      Endocrine (within normal limits)      GI (within normal limits)      /Renal (within normal limits)      Neuro/Psych (within normal limits)      Pulmonary (within normal limits)      GI/Hepatic (within normal limits)      Hematology (within normal limits)      Musculoskeletal (within normal limits)      Eyes, Ears, Nose, and Throat (within normal limits)      Infectious Disease (within normal limits)       Clinical information reviewed:   Tobacco  Allergies  Meds  Problems  Med Hx  Surg Hx   Fam Hx          NPO Detail:  NPO/Void Status  Date of Last Liquid: 24  Time of Last Liquid: 0311  Date of Last Solid: 24  Time of Last Solid: 1800         OB/Gyn Evaluation    Present Pregnancy    Patient is pregnant now.   Obstetric History                Physical Exam    Airway  Mallampati: II  TM distance: >3 FB  Neck ROM: full     Cardiovascular - normal exam  Rhythm: regular  Rate: normal     Dental - normal exam     Pulmonary - normal exam  Breath sounds clear to auscultation     Abdominal - normal exam    Comments: gravid           Anesthesia Plan    History of general anesthesia?: yes  History of complications of general anesthesia?: no    ASA 2     epidural     The patient is not a current smoker.    Anesthetic plan and risks discussed with patient.  Use of blood products discussed with patient who consented to blood products.      I informed and discussed the risks and benefits of general, spinal and epidural anesthesia with the patient.  The patient expressed her understanding and her questions were answered.  A verbal consent was given by the patient.

## 2024-03-24 NOTE — ANESTHESIA PREPROCEDURE EVALUATION
Patient: Avelina Moulton    Evaluation Method: In-person visit    Procedure Information    Date: 24  Procedure: Labor Consult        28 y.o.  at 38w4d SROM, labor. Desires NCB at this time.      Relevant Problems   Anesthesia (within normal limits)      Cardiovascular (within normal limits)      Endocrine (within normal limits)      GI (within normal limits)      /Renal (within normal limits)      Neuro/Psych (within normal limits)      Pulmonary (within normal limits)      GI/Hepatic (within normal limits)      Hematology (within normal limits)      Musculoskeletal (within normal limits)      Eyes, Ears, Nose, and Throat (within normal limits)      Infectious Disease (within normal limits)       Clinical information reviewed:   Tobacco  Allergies  Meds  Problems  Med Hx  Surg Hx   Fam Hx          NPO Detail:  NPO/Void Status  Date of Last Liquid: 24  Time of Last Liquid: 0311  Date of Last Solid: 24  Time of Last Solid: 1800         OB/Gyn Evaluation    Present Pregnancy    Patient is pregnant now.   Obstetric History                Physical Exam    Airway  Mallampati: II  TM distance: >3 FB  Neck ROM: full     Cardiovascular - normal exam  Rhythm: regular  Rate: normal     Dental - normal exam     Pulmonary - normal exam  Breath sounds clear to auscultation     Abdominal - normal exam    Comments: gravid           Anesthesia Plan    History of general anesthesia?: yes  History of complications of general anesthesia?: no    ASA 2     epidural     The patient is not a current smoker.    Anesthetic plan and risks discussed with patient.  Use of blood products discussed with patient who consented to blood products.      I informed and discussed the risks and benefits of general, spinal and epidural anesthesia with the patient.  The patient expressed her understanding and her questions were answered.  A verbal consent was given by the patient.

## 2024-03-24 NOTE — ANESTHESIA PROCEDURE NOTES
Epidural Block    Patient location during procedure: OB  Start time: 3/24/2024 9:15 AM  Reason for block: labor analgesia  Staffing  Performed: CRNA   Authorized by: GAGANDEEP Pablo    Performed by: GAGANDEEP Pablo    Preanesthetic Checklist  Completed: patient identified, IV checked, risks and benefits discussed, surgical consent, pre-op evaluation, timeout performed and sterile techniques followed  Block Timeout  RN/Licensed healthcare professional reads aloud to the Anesthesia provider and entire team: Patient identity, procedure with side and site, patient position, and as applicable the availability of implants/special equipment/special requirements.  Patient on coagulant treatment: no  Timeout performed at: 3/24/2024 9:12 AM  Block Placement  Patient position: sitting  Prep: ChloraPrep  Sterility prep: gloves, mask, drape and cap  Sedation level: no sedation  Patient monitoring: blood pressure and continuous pulse oximetry  Approach: midline  Local numbing: lidocaine 1% to skin and subcutaneous tissues  Vertebral space: lumbar  Lumbar location: L3-L4  Epidural  Loss of resistance technique: saline  Guidance: landmark technique        Needle  Needle type: Tuohy   Needle gauge: 17  Needle length: 8.9cm  Needle insertion depth: 5 cm  Catheter type: multi-orifice  Catheter size: 19 G  Catheter at skin depth: 11 cm  Catheter securement method: clear occlusive dressing, surgical tape, stabilization device and liquid medical adhesive    Test dose: lidocaine 1.5% with epinephrine 1-to-200,000  Test dose: lidocaine 1.5% with epinephrine 1-to-200,000  Test dose result: no positive test dose    PCEA  Medication concentration used: 0.2% Ropivacaine with 2 mcg/mL Fentanyl  Dose (mL): 5  Lockout (minutes): 30  1-Hour Limit (boluses/hr): 2  Basal Rate: 10        Assessment  Block outcome: patient comfortable  Number of attempts: 1  Events: no positive test dose  Procedure assessment: patient  tolerated procedure well with no immediate complications  Additional Notes  Some resistance noted while threading catheter. Patient with full resolution of pain with bolus.

## 2024-03-24 NOTE — H&P
Obstetrical Admission History and Physical     Avelina Moulton is a 28 y.o.  at 38w4d. MEI: 4/3/2024, by Last Menstrual Period. Estimated fetal weight: 7#. She has had prenatal care with Dr. Li .    Chief Complaint: Leakage/Loss of Fluid (My water broke around 1:30 a.m. The fluid was clear)    Assessment/Plan      Pregnancy uncomplicated     A:   -latent labor, s/p SROM for clear fluid   -CAT 1 tracing  -GBS negative  -Rh positive   -Rubella non-immune     P:  -admission, routine labs  -intermittent fetal monitoring while appropriate per guidelines, cEFM otherwise  -desires NCB    -offer PP TJ Mota APRN-CNM         Pregnancy Problems (from 10/24/23 to present)       Problem Noted Resolved    Encounter for supervision of normal intrauterine pregnancy in primigravida, antepartum 3/24/2024 by Chen Mota, APRN-CNM No    Priority:  Medium            Options for delivery have been discussed with the patient and she elects a vaginal delivery.  Labor has been discussed in detail. The risks, benefits, complications, alternatives, expected outcomes, potential problems during recuperation and recovery, and the risks of not performing the procedure were discussed with the patient. The patient stated understanding that the risks of delivery include, but are not limited to: death; reaction to medications; injury to bowel, bladder, ureters, uterus, cervix, vagina, and other pelvic and abdominal structures, infection; blood loss and possible need for transfusion; and potential need for surgery, including hysterectomy. The risks of injury to the infant during delivery were also discussed. All questions were answered. The patient is wishing to continue with plans for a vaginal delivery.    Admit to inpatient status. I anticipate that this patient will require a stay exceeding at least 2 midnights for delivery and postpartum.  Active management of rupture of membranes.  Management of pregnancy complications,  as indicated.    Inez Quan presents to [facility] with her partner and parents c/o contractions and Spontaneous Rupture of Membranes of clear  fluid at  0130 hr on 3/24/24 . She endorses regular fetal movement and denies HA, blurred vision, chest or RUQ pain, urinary complaints, vaginal bleeding, or edema.        Obstetrical History   OB History    Para Term  AB Living   1             SAB IAB Ectopic Multiple Live Births                  # Outcome Date GA Lbr Jcarlos/2nd Weight Sex Delivery Anes PTL Lv   1 Current                Past Medical History  Past Medical History:   Diagnosis Date    Secondary amenorrhea 2021    Secondary amenorrhea        Past Surgical History   Past Surgical History:   Procedure Laterality Date    NO PAST SURGERIES         Social History  Social History     Tobacco Use    Smoking status: Never    Smokeless tobacco: Never   Substance Use Topics    Alcohol use: Not Currently     Substance and Sexual Activity   Drug Use Never       Allergies  Patient has no known allergies.     Medications  No medications prior to admission.       Objective    Last Vitals  Temp Pulse Resp BP MAP O2 Sat   37 °C (98.6 °F) 90 20 115/68   97 %     Physical Examination  GENERAL: Examination reveals a well developed, well nourished, gravid female in no acute distress. She is alert and cooperative.  HEENT: PERRLA. External ears normal. Nose normal, no erythema or discharge. Mouth and throat clear.  NECK: supple, no significant adenopathy  LUNGS: clear to auscultation bilaterally  HEART: regular rate and rhythm, S1, S2 normal, no murmur, click, rub or gallop  BREASTS: breasts appear normal for pregnancy, no suspicious masses, no skin or nipple changes or axillary nodes  ABDOMEN: soft, gravid, nontender, nondistended, no abnormal masses, no epigastric pain  FHR is 135 with moderate variability and accels, no decels or sinusoidal patterns. Cat 1 tracing.   [place] reading: Contractions  "every 3 - 6 minutes, duration of 30 - 80 seconds, mild to moderate contractile intensity and soft uterine resting tone.    The fetus is in a vertex presentation, determined by ultrasound  Current Estimated Fetal Weight 7# established by Leopold's maneuver  VAGINA: normal appearing vagina with normal color and discharge and no lesions noted  CERVIX: 3  cm dilated, 90  % effaced,  -1 station; MEMBRANES are  grossly ruptured and nitrazine positive for clear, non-odorous fluid   EXTREMITIES: no redness or tenderness in the calves or thighs, no edema  SKIN: normal coloration and turgor, no rashes  NEUROLOGICAL: alert, oriented, normal speech, no focal findings or movement disorder noted  PSYCHOLOGICAL: awake and alert; oriented to person, place, and time    Lab Review  Labs in chart were reviewed.  No results found for: \"ABO\", \"LABRH\", \"ABSCRN\"  No results found for: \"WBC\", \"HGB\", \"HCT\", \"PLT\"  No results found for: \"GRPBSTREP\"  No results found for: \"GLUCOSE\", \"NA\", \"K\", \"CL\", \"CO2\", \"ANIONGAP\", \"BUN\", \"CREATININE\", \"EGFR\", \"CALCIUM\", \"ALBUMIN\", \"PROT\", \"ALKPHOS\", \"ALT\", \"AST\", \"BILITOT\"    "

## 2024-03-25 ENCOUNTER — PHARMACY VISIT (OUTPATIENT)
Dept: PHARMACY | Facility: CLINIC | Age: 29
End: 2024-03-25
Payer: COMMERCIAL

## 2024-03-25 PROCEDURE — 2500000004 HC RX 250 GENERAL PHARMACY W/ HCPCS (ALT 636 FOR OP/ED): Performed by: ADVANCED PRACTICE MIDWIFE

## 2024-03-25 PROCEDURE — RXMED WILLOW AMBULATORY MEDICATION CHARGE

## 2024-03-25 PROCEDURE — 7210000002 HC LABOR PER HOUR

## 2024-03-25 PROCEDURE — 59050 FETAL MONITOR W/REPORT: CPT

## 2024-03-25 PROCEDURE — 2500000001 HC RX 250 WO HCPCS SELF ADMINISTERED DRUGS (ALT 637 FOR MEDICARE OP): Performed by: ADVANCED PRACTICE MIDWIFE

## 2024-03-25 PROCEDURE — 1120000001 HC OB PRIVATE ROOM DAILY

## 2024-03-25 PROCEDURE — 7100000016 HC LABOR RECOVERY PER HOUR

## 2024-03-25 PROCEDURE — 2500000005 HC RX 250 GENERAL PHARMACY W/O HCPCS: Performed by: ADVANCED PRACTICE MIDWIFE

## 2024-03-25 RX ORDER — DOCUSATE SODIUM 100 MG/1
100 CAPSULE, LIQUID FILLED ORAL 2 TIMES DAILY PRN
Qty: 30 CAPSULE | Refills: 5 | Status: SHIPPED | OUTPATIENT
Start: 2024-03-25

## 2024-03-25 RX ORDER — ACETAMINOPHEN 325 MG/1
975 TABLET ORAL EVERY 6 HOURS PRN
Qty: 30 TABLET | Refills: 1 | Status: SHIPPED | OUTPATIENT
Start: 2024-03-25 | End: 2024-04-24

## 2024-03-25 RX ORDER — IBUPROFEN 600 MG/1
600 TABLET ORAL EVERY 6 HOURS PRN
Qty: 30 TABLET | Refills: 0 | Status: SHIPPED | OUTPATIENT
Start: 2024-03-25 | End: 2024-04-24

## 2024-03-25 RX ADMIN — ACETAMINOPHEN 975 MG: 325 TABLET ORAL at 06:54

## 2024-03-25 RX ADMIN — ACETAMINOPHEN 975 MG: 325 TABLET ORAL at 13:43

## 2024-03-25 RX ADMIN — POLYETHYLENE GLYCOL 3350 17 G: 17 POWDER, FOR SOLUTION ORAL at 18:56

## 2024-03-25 RX ADMIN — IBUPROFEN 600 MG: 600 TABLET, FILM COATED ORAL at 20:38

## 2024-03-25 RX ADMIN — IBUPROFEN 600 MG: 600 TABLET, FILM COATED ORAL at 13:42

## 2024-03-25 RX ADMIN — ACETAMINOPHEN 975 MG: 325 TABLET ORAL at 20:38

## 2024-03-25 RX ADMIN — IBUPROFEN 600 MG: 600 TABLET, FILM COATED ORAL at 00:05

## 2024-03-25 RX ADMIN — BENZOCAINE AND LEVOMENTHOL 1 APPLICATION: 200; 5 SPRAY TOPICAL at 01:17

## 2024-03-25 RX ADMIN — ACETAMINOPHEN 975 MG: 325 TABLET ORAL at 00:05

## 2024-03-25 RX ADMIN — IBUPROFEN 600 MG: 600 TABLET, FILM COATED ORAL at 06:54

## 2024-03-25 RX ADMIN — Medication 1 EACH: at 01:17

## 2024-03-25 ASSESSMENT — ACTIVITIES OF DAILY LIVING (ADL): LACK_OF_TRANSPORTATION: NO

## 2024-03-25 ASSESSMENT — PAIN SCALES - GENERAL
PAINLEVEL_OUTOF10: 0 - NO PAIN
PAINLEVEL_OUTOF10: 4
PAINLEVEL_OUTOF10: 2
PAINLEVEL_OUTOF10: 2

## 2024-03-25 NOTE — L&D DELIVERY NOTE
OB Delivery Note  3/24/2024  Avelina Kenney  28 y.o.   Vaginal, Spontaneous        Gestational Age: 38w4d  /Para:   Quantitative Blood Loss: Admission to Discharge: 300 mL (3/24/2024  2:37 AM - 3/24/2024  9:47 PM)    Progressed to complete and pushed successfully to a spontaneous vaginal birth of a live female.  Head delivered and was followed by shoulders and body without difficulty.Infant was vigorous and placed on maternal abdomen.  When cord stopped pulsating it was clamped by CNM and cut by FOB.   Complications:  fever, placenta to pathology  Mother and baby recovering in satisfactory condition.      Jason Moulton [[id number]]      Labor Events    Rupture date/time: 3/24/2024 0130  Rupture type: Spontaneous  Fluid color: Clear  Fluid odor: None  Labor type: Spontaneous Onset of Labor  Labor allowed to proceed with plans for an attempted vaginal birth?: Yes  Augmentation: None  Complications: None       Labor Event Times    Labor onset date/time: 3/24/2024 0130  Dilation complete date/time: 3/24/2024 1925  Start pushing date/time: 3/24/2024 2039       Placenta    Placenta delivery date/time: 3/24/2024 2127  Placenta removal: Spontaneous  Placenta appearance: Intact  Placenta disposition: pathology       Cord    Vessels: 3 vessels  Complications: None  Delayed cord clamping?: Yes  Cord clamped date/time: 3/24/2024 2113  Gases sent?: No  Stem cell collection (by provider): No       Lacerations    Episiotomy: None  Perineal laceration: 3a  Perineal laceration repaired?: Yes  Other lacerations?: No  Repair suture: 3-0 Synthetic Suture       Anesthesia    Method: Epidural  Analgesics: WATER       Operative Delivery    Forceps attempted?: No  Vacuum extractor attempted?: No       Shoulder Dystocia    Shoulder dystocia present?: No       [place] Delivery    Time head delivered: 3/24/2024 21:15:00  Birth date/time: 3/24/2024 21:15:00  Delivery type: Vaginal, Spontaneous  Complications: None        Resuscitation    Method: None       Apgars    Living status: Living  Apgar Component Scores:  1 min.:  5 min.:  10 min.:  15 min.:  20 min.:    Skin color:  1  1       Heart rate:  1  2       Reflex irritability:  2  2       Muscle tone:  2  2       Respiratory effort:  2  2       Total:  8  9       Apgars assigned by: MARY DOUGLAS       Delivery Providers    Delivering clinician: ELVIA Johnson   Provider Role    Nikki Mejia RN Delivery Nurse    Mary Douglas, RN Nursery Nurse     Resident                 ELVIA Johnson

## 2024-03-25 NOTE — PROGRESS NOTES
Postpartum Progress Note    Assessment/Plan   Avelina Moulton is a 28 y.o., , who delivered at 38w4d gestation and is now postpartum day 1.     Order to [facility] - patient aware they will call   Hitting pp milestones for day 1   Continue ice to perineum   Continue pain control   Breastfeeding support   Anticipate discharge 3-26-24    Active Problems:    Single liveborn infant delivered vaginally    Pregnancy Problems (from 10/24/23 to present)       Problem Noted Resolved    Encounter for supervision of normal intrauterine pregnancy in primigravida, antepartum 3/24/2024 by JORGE Pritchard-DOMINIK 3/24/2024 by ELVIA Johnson    Priority:  Medium      38 weeks gestation of pregnancy 3/24/2024 by ELVIA Johnson 3/24/2024 by ELVIA Johnson    Primigravida in third trimester 3/24/2024 by ELVIA Johnson 3/24/2024 by ELVIA Johnson          Hospital course:  SVB + Repair of 3rd degree lab    Avelina is RH positive and not a candidate for RhoGAM    Subjective   Her pain is well controlled with current medications  She is passing flatus  She is ambulating well  She is tolerating a Adult diet Regular  She reports no breast or nursing problems  She denies emotional concerns today   Her plan for contraception is none     Will discuss at postpartum visit.    Objective   Allergies:   Patient has no known allergies.         Last Vitals:  Temp Pulse Resp BP MAP Pulse Ox   36.6 °C (97.9 °F) 84 18 109/72   98 %     Vitals Min/Max Last 24 Hours:  Temp  Min: 36.1 °C (97 °F)  Max: 38 °C (100.4 °F)  Pulse  Min: 77  Max: 150  Resp  Min: 18  Max: 19  BP  Min: 90/51  Max: 140/70    Intake/Output:     Intake/Output Summary (Last 24 hours) at 3/25/2024 0850  Last data filed at 3/25/2024 0440  Gross per 24 hour   Intake 2337.58 ml   Output 1625 ml   Net 712.58 ml       Physical Exam:  General: Examination reveals a well developed, well nourished, female, in no acute  distress. She is alert and cooperative.  Breasts: lactating, no erythema or tenderness, nipples normal.  Fundus: below umbilicus.  Perineum: well approximated.  Extremities: no edema.  Neurological: alert, oriented, normal speech, no focal findings or movement disorder noted.  Psychological: awake and alert; oriented to person, place, and time.    Lab Data:  Labs in chart were reviewed.

## 2024-03-25 NOTE — LACTATION NOTE
Lactation Consultant Note  Lactation Consultation       Maternal Information       Maternal Assessment       Infant Assessment       Feeding Assessment       LATCH TOOL       Breast Pump       Other OB Lactation Tools       Patient Follow-up       Other OB Lactation Documentation       Recommendations/Summary  Observed mom latch  in football hold. Mom was achieving a shallow latch on the nipple and complained of pain while latching. Mom's nipples are reddened bilaterally. Assisted  mom with re latching in football but due to mom's small anatomy, it was difficult to achieve the deep latch in football. Assisted mom with cross cradle hold in which we achieved a deep latch with nutritive sucking pattern and audible swallows. Reviewed nipple care with mom due to her soreness. Reviewed initial education including feeding cues, skin to skin, diaper expectations and feeding frequency. Mom verbalized understanding.

## 2024-03-25 NOTE — PROGRESS NOTES
Intrapartum Progress Note    Assessment   Avelina Moulton is a 28 y.o.  at 38w4d. MEI: 4/3/2024, by Last Menstrual Period.   FHT Category 1  Second stage of labor  Afebrile, recheck temp at 37.1 after highest of 37.8 prior to 1900    Plan   Encourage frequent position changes as tolerated  Continue assessment of maternal and fetal wellbeing  Recheck as clinically indicated by maternal or fetal status  Anticipate NSVB  Pushing initiated      Subjective   Coping well, feeling pressure  Family at bedside for support    Pregnancy complicated by:  Pregnancy Problems (from 10/24/23 to present)       Problem Noted Resolved    Encounter for supervision of normal intrauterine pregnancy in primigravida, antepartum 3/24/2024 by ELVIA Pritchard No    Priority:  Medium      38 weeks gestation of pregnancy 3/24/2024 by ELVIA Johnson No    Priority:  Medium      Primigravida in third trimester 3/24/2024 by ELVIA Johnson No    Priority:  Medium            Principal Problem:    Encounter for supervision of normal intrauterine pregnancy in primigravida, antepartum  Active Problems:    38 weeks gestation of pregnancy    Primigravida in third trimester      Objective   Last Vitals:  Temp Pulse Resp BP MAP Pulse Ox   37.5 °C (99.5 °F) 108 18 118/71   98 %     Vitals Min/Max Last 24 Hours:  Temp  Min: 36.1 °C (97 °F)  Max: 37.9 °C (100.2 °F)  Pulse  Min: 73  Max: 150  Resp  Min: 18  Max: 20  BP  Min: 98/55  Max: 140/70    Intake/Output:    Intake/Output Summary (Last 24 hours) at 3/24/2024 2142  Last data filed at 3/24/2024 2000  Gross per 24 hour   Intake 2337.58 ml   Output 675 ml   Net 1662.58 ml         Physical Examination:  CERVIX:  10/100/+2 ; MEMBRANES are SROM    Fetal Monitoring      Baseline FHR: 150 per minute  Variability: moderate  Accelerations: yes  Decelerations: variable non persistent  TOCO: regular, every 2 minutes    Lab Review:  Lab Results   Component Value Date    ABO B  03/24/2024    LABRH POS 03/24/2024    ABSCRN NEG 03/24/2024     Lab Results   Component Value Date    WBC 9.7 03/24/2024    HGB 12.4 03/24/2024    HCT 36.8 03/24/2024     03/24/2024     JORGE Johnson-DOMINIKM

## 2024-03-26 ENCOUNTER — APPOINTMENT (OUTPATIENT)
Dept: OBSTETRICS AND GYNECOLOGY | Facility: CLINIC | Age: 29
End: 2024-03-26
Payer: COMMERCIAL

## 2024-03-26 VITALS
BODY MASS INDEX: 24.43 KG/M2 | HEART RATE: 76 BPM | SYSTOLIC BLOOD PRESSURE: 100 MMHG | WEIGHT: 129.41 LBS | DIASTOLIC BLOOD PRESSURE: 63 MMHG | OXYGEN SATURATION: 97 % | TEMPERATURE: 97.3 F | RESPIRATION RATE: 18 BRPM | HEIGHT: 61 IN

## 2024-03-26 PROCEDURE — 2500000001 HC RX 250 WO HCPCS SELF ADMINISTERED DRUGS (ALT 637 FOR MEDICARE OP): Performed by: ADVANCED PRACTICE MIDWIFE

## 2024-03-26 RX ADMIN — IBUPROFEN 600 MG: 600 TABLET, FILM COATED ORAL at 08:53

## 2024-03-26 RX ADMIN — ACETAMINOPHEN 975 MG: 325 TABLET ORAL at 02:50

## 2024-03-26 RX ADMIN — ACETAMINOPHEN 975 MG: 325 TABLET ORAL at 08:53

## 2024-03-26 RX ADMIN — IBUPROFEN 600 MG: 600 TABLET, FILM COATED ORAL at 02:50

## 2024-03-26 ASSESSMENT — PAIN SCALES - GENERAL
PAINLEVEL_OUTOF10: 2
PAINLEVEL_OUTOF10: 4

## 2024-03-26 NOTE — DISCHARGE SUMMARY
Discharge Summary    Admission Date: 3/24/2024  Discharge Date: 3/26/24    Discharge Diagnosis  Encounter for supervision of normal intrauterine pregnancy in primigravida, antepartum    Hospital Course  Delivery Date: 3/24/2024  9:15 PM   Delivery type: Vaginal, Spontaneous    GA at delivery: 38w4d  Outcome: Living   Anesthesia during delivery: Epidural   Intrapartum complications: None   Feeding method: Breastfeeding Status: Yes     Procedures: none  Contraception at discharge: none    Avelina reports feeling well overall and ready for discharge to home.   She denies heavy bleeding.   Urinating and passing flatus without difficulty.       Pertinent Physical Exam At Time of Discharge    General: Examination reveals a well developed, well nourished, female, in no acute distress. She is alert and cooperative.  Lungs: unlabored breathing.  Abdomen: soft, nontender.  Fundus: firm, below umbilicus, and nontender.  Perineum: well approximated and well healing.  Extremities: no redness or tenderness in the calves or thighs, no edema.  Neurological: alert, oriented, normal speech, no focal findings or movement disorder noted.  Psychological: awake and alert; oriented to person, place, and time.    Discharge Meds     Your medication list        START taking these medications        Instructions Last Dose Given Next Dose Due   acetaminophen 325 mg tablet  Commonly known as: Tylenol      Take 3 tablets (975 mg) by mouth every 6 hours if needed for mild pain (1 - 3).       docusate sodium 100 mg capsule  Commonly known as: Colace      Take 1 capsule (100 mg) by mouth 2 times a day as needed for constipation.       ibuprofen 600 mg tablet      Take 1 tablet (600 mg) by mouth every 6 hours if needed for mild pain (1 - 3).                 Where to Get Your Medications        These medications were sent to [facility]  [address], [address], [place] [facility] [address]      Hours: 8 AM to 6 PM Mon-Fri, 9 AM to 1 PM Saturday  Phone: [phone]   acetaminophen 325 mg tablet  docusate sodium 100 mg capsule  ibuprofen 600 mg tablet          Complications Requiring Follow-Up  3A perineal laceration with repair--> referred to [facility].    Test Results Pending At Discharge  Pending Labs       Order Current Status    Surgical Pathology Exam - PLACENTA In process            Outpatient Follow-Up  No future appointments.    [facility] clinic referral in.   Will follow-up with primary OB provider in 4-6 weeks for routine postpartum visit.     I spent 20 minutes in the professional and overall care of this patient.      JORGE Padilla-DOMINIKM

## 2024-03-26 NOTE — ANESTHESIA POSTPROCEDURE EVALUATION
"Patient: Avelina Moulton    Procedure Summary       Date: 03/24/24 Room / Location:     Anesthesia Start: 0915 Anesthesia Stop: 2115    Procedure: Labor Analgesia Diagnosis:     Scheduled Providers:  Responsible Provider: GAGANDEEP Pablo    Anesthesia Type: epidural ASA Status: 2            Anesthesia Type: epidural    /68 (BP Location: Right arm, Patient Position: Lying)   Pulse 88   Temp 36.2 °C (97.2 °F) (Temporal)   Resp 18   Ht 1.549 m (5' 1\")   Wt 58.7 kg (129 lb 6.6 oz)   LMP 06/28/2023   SpO2 98%   Breastfeeding Yes   BMI 24.45 kg/m²        Anesthesia Post Evaluation    Patient location during evaluation: bedside  Patient participation: complete - patient participated  Level of consciousness: awake and alert  Pain management: adequate  Airway patency: patent  Cardiovascular status: acceptable  Respiratory status: acceptable  Hydration status: acceptable  Postoperative Nausea and Vomiting: none  Comments: Epidural catheter removed by nursing. No redness, swelling, or drainage at puncture site.    Complete resolution of numbness. Patient is able to lift legs, bend at the knees, and ambulate.    Patient denies problems with urination.    Patient denies nausea, headache or severe back pain.         There were no known notable events for this encounter.    "

## 2024-03-26 NOTE — CARE PLAN
Problem: Postpartum  Goal: Experiences normal postpartum course  Outcome: Met  Goal: Appropriate maternal -  bonding  Outcome: Met  Goal: Establish and maintain infant feeding pattern for adequate nutrition  Outcome: Met  Goal: Incisions, wounds, or drain sites healing without S/S of infection  Outcome: Met  Goal: No s/sx infection  Outcome: Met  Goal: No s/sx of hemorrhage  Outcome: Met  Goal: Minimal s/sx of HDP and BP<160/110  Outcome: Met  Problem: Pain - Adult  Goal: Verbalizes/displays adequate comfort level or baseline comfort level  Outcome: Met     Problem: Safety - Adult  Goal: Free from fall injury  Outcome: Met     Problem: Discharge Planning  Goal: Discharge to home or other facility with appropriate resources  Outcome: Met      The patient's goals for the shift include  bonding with baby    The clinical goals for the shift include Adequate pain management

## 2024-03-26 NOTE — LACTATION NOTE
Lactation Consultant Note  Lactation Consultation  Reason for Consult: Follow-up assessment  Consultant Name: Avelina MELENDEZ    Maternal Information  Has mother  before?: No  Infant to breast within first 2 hours of birth?: Yes  Exclusive Pump and Bottle Feed: No  WIC Program: No    Maternal Assessment  Breast Assessment: Small  Nipple Assessment: Red/inflamed  Alterations in Nipple Condition: Stage I - pain or irritation with no skin break down  Areola Assessment: Normal    Infant Assessment  Infant Behavior: Awake, Rooting response, Feeding cues observed  Infant Assessment:  (deferred)    Feeding Assessment  Nutrition Source: Breastmilk  Feeding Method: Nursing at the breast  Feeding Position: Cross - cradle, Mother demonstrates good positioning  Suck/Feeding: Sustained, Audible swallowing with stimulaton  Latch Assessment: Minimal assistance is needed, Deep latch obtained, Instructed on deep latch, Sucks with long jaw movement    LATCH TOOL  Latch: Grasps breast, tongue down, lips flanged, rhythmic sucking  Audible Swallowing: Spontaneous and intermittent (24 hours old)  Type of Nipple: Everted (After stimulation)  Comfort (Breast/Nipple): Filling, red/small blisters/bruises, mild/moderate discomfort  Hold (Positioning): Minimal assist, teach one side, mother does other, staff holds  LATCH Score: 8    Breast Pump       Other OB Lactation Tools       Patient Follow-up  Outpatient Lactation Follow-up: Recommended    Other OB Lactation Documentation       Recommendations/Summary  Observed feed. Baby latched after mom tried 1-2 times. Mom positioned baby well for feeds. Baby remained latched and demonstrated swallows during feed. Reviewed with patient milk supply patterns and  feeding patterns in the fist and second 24 HOL.Patient asked to attempt/feed baby at least every 2-3 hours or on demand with a goal of 8-12 feeds daily. Feedign cues reviewed with mom.Breastfeeding education reviewed and questions  answered. Mom aware of lactation support and asked to call out for feed assistance and with questions as needed.

## 2024-03-28 LAB
LABORATORY COMMENT REPORT: NORMAL
PATH REPORT.FINAL DX SPEC: NORMAL
PATH REPORT.GROSS SPEC: NORMAL
PATH REPORT.RELEVANT HX SPEC: NORMAL
PATH REPORT.TOTAL CANCER: NORMAL

## 2024-04-15 ENCOUNTER — TELEPHONE (OUTPATIENT)
Dept: OBSTETRICS AND GYNECOLOGY | Facility: CLINIC | Age: 29
End: 2024-04-15
Payer: COMMERCIAL

## 2024-04-15 NOTE — TELEPHONE ENCOUNTER
Patient is 3 weeks postpartum and has a   Question regarding her bleeding it was first red than brown and now red again

## 2024-04-18 NOTE — SIGNIFICANT EVENT
Called to room for suspected 3rd degree laceration following .    Inspection of perineum reveals a <50% disruption of the EAS measuring 2cms. The ends were reapproximated with interrupted 3.0 vicryl sutures. The remainder of the laceration was repaired in standard fashion with 3.0 vicryl. Pt tolerated procedure well.

## 2024-05-07 ENCOUNTER — POSTPARTUM VISIT (OUTPATIENT)
Dept: OBSTETRICS AND GYNECOLOGY | Facility: CLINIC | Age: 29
End: 2024-05-07
Payer: COMMERCIAL

## 2024-05-07 VITALS
BODY MASS INDEX: 21.71 KG/M2 | DIASTOLIC BLOOD PRESSURE: 74 MMHG | SYSTOLIC BLOOD PRESSURE: 110 MMHG | WEIGHT: 115 LBS | HEIGHT: 61 IN

## 2024-05-07 ASSESSMENT — EDINBURGH POSTNATAL DEPRESSION SCALE (EPDS)
THE THOUGHT OF HARMING MYSELF HAS OCCURRED TO ME: NEVER
I HAVE BEEN SO UNHAPPY THAT I HAVE BEEN CRYING: NO, NEVER
I HAVE LOOKED FORWARD WITH ENJOYMENT TO THINGS: AS MUCH AS I EVER DID
I HAVE FELT SCARED OR PANICKY FOR NO GOOD REASON: NO, NOT AT ALL
I HAVE BLAMED MYSELF UNNECESSARILY WHEN THINGS WENT WRONG: NO, NEVER
I HAVE BEEN ABLE TO LAUGH AND SEE THE FUNNY SIDE OF THINGS: AS MUCH AS I ALWAYS COULD
I HAVE BEEN SO UNHAPPY THAT I HAVE HAD DIFFICULTY SLEEPING: NOT AT ALL
I HAVE BEEN ANXIOUS OR WORRIED FOR NO GOOD REASON: NO, NOT AT ALL
TOTAL SCORE: 0
THINGS HAVE BEEN GETTING ON TOP OF ME: NO, I HAVE BEEN COPING AS WELL AS EVER
I HAVE FELT SAD OR MISERABLE: NO, NOT AT ALL

## 2024-05-07 NOTE — PROGRESS NOTES
"Chief Complaint   Patient presents with    Postpartum Care     6 week postpartum visit    3/24/24    Girl, \"Tata\"    Sherry Dominique delivered    Breastfeeding, going well    Chaperone declined     Patient is 6 weeks status post vaginal delivery.  Did encounter third-degree extension of her perineal laceration.  This took 2 weeks to feel better but now feels entirely back to normal.  Bowel bladder function is normal.  Has no significant pain.  Bleeding has resolved.  Patient is breast-feeding and doing well with this.  Overall doing well both physically and emotionally with respect to transitioning to parenting.  Declines BC    REVIEW OF SYSTEMS    Please see HPI for reported pertinent positives, which would supersede this ROS    Constitutional:  Denies fever, chills, wt gain or loss, fatigue    Genito-Urinary:  Denies genital lesion or sores, vaginal dryness, itching  or pain.  No abnormal vaginal discharge or unexplained vaginal bleeding.  No dysuria, urinary incontinence or frequency.  Denies pelvic pain, dysmenorrhea or dyspareunia.    Eyes:  Denies vision changes, dryness  ENT:  No hearing loss, sinus pain or congestion, nosebleeds  Cardiovascular:  No chest pain or palpitations  Respiratory:  No SOB, cough, wheezing  GI:  No Nausea, vomiting, diarrhea, constipation, abdominal pain  Musculoskeletal:  No new back pain. joint pain, peripheral edema  Skin:  No rash or skin lesion  Neurologic:  No HA, numbness or dizziness  Psychiatric:  No new anxiety or depression  Endocrine:  No loss of hair or hirsutism  Hematologic/lymphatic:  No swollen lymph nodes.  No reported tendency for easy bruising or bleeding    Patient admits to no other systemic complaints      PHYSICAL EXAM      PSYCH:  Pt is alert, oriented and cooperative    SKIN: warm, dry, w/o lesion    HEAD AND FACE:  External inspection of eyes, ears, functional cranial nerves normal and intact    THYROID:  No thyromegaly    CARDIOVASCULAR:  Warm and " well Perfused    PULMONARY:  No respiratory distress    ABDOMEN:  soft, nontender.  No mass or organomegaly.      PELVIC:    External genitalia, urethra, perianal region normal to inspection and palpation if indicated.  No inguinal LA  Perineum well healed    Assessment/Plan    Diagnoses and all orders for this visit:  Routine postpartum follow-up

## 2024-05-18 ENCOUNTER — PATIENT MESSAGE (OUTPATIENT)
Dept: OBSTETRICS AND GYNECOLOGY | Facility: CLINIC | Age: 29
End: 2024-05-18
Payer: COMMERCIAL

## 2024-06-25 ENCOUNTER — TELEPHONE (OUTPATIENT)
Dept: OBSTETRICS AND GYNECOLOGY | Facility: CLINIC | Age: 29
End: 2024-06-25
Payer: COMMERCIAL

## 2024-06-25 DIAGNOSIS — N61.0 MASTITIS: Primary | ICD-10-CM

## 2024-06-25 RX ORDER — DICLOXACILLIN SODIUM 500 MG/1
500 CAPSULE ORAL 4 TIMES DAILY
Qty: 40 CAPSULE | Refills: 0 | Status: SHIPPED | OUTPATIENT
Start: 2024-06-25 | End: 2024-07-05

## 2024-06-25 NOTE — TELEPHONE ENCOUNTER
Patient called this morning with right breast pain, fever of 100.6F, headache and warmth on right breast. Patient has been massaging and using warm compresses. Will you please call patient to see about next steps? Patient has questions about breastfeeding and would like something sent in.

## 2024-07-15 ENCOUNTER — TELEPHONE (OUTPATIENT)
Dept: OBSTETRICS AND GYNECOLOGY | Facility: CLINIC | Age: 29
End: 2024-07-15
Payer: COMMERCIAL

## 2024-07-15 NOTE — TELEPHONE ENCOUNTER
Patient has a lump on right breast near nipple, no changes in skin etc,  size of a dime, has been treating like mastitis since Friday, no changes. Please advise/ call patient.

## 2024-07-19 ENCOUNTER — OFFICE VISIT (OUTPATIENT)
Dept: OBSTETRICS AND GYNECOLOGY | Facility: CLINIC | Age: 29
End: 2024-07-19
Payer: COMMERCIAL

## 2024-07-19 VITALS — SYSTOLIC BLOOD PRESSURE: 100 MMHG | WEIGHT: 109 LBS | BODY MASS INDEX: 20.6 KG/M2 | DIASTOLIC BLOOD PRESSURE: 70 MMHG

## 2024-07-19 DIAGNOSIS — N63.10 MASS OF RIGHT BREAST, UNSPECIFIED QUADRANT: ICD-10-CM

## 2024-07-19 PROCEDURE — 99213 OFFICE O/P EST LOW 20 MIN: CPT | Performed by: OBSTETRICS & GYNECOLOGY

## 2024-07-19 PROCEDURE — 1036F TOBACCO NON-USER: CPT | Performed by: OBSTETRICS & GYNECOLOGY

## 2024-07-19 NOTE — PROGRESS NOTES
Chief Complaint   Patient presents with    Breast Mass     Right Breast Lump        C/O right breast lump x1 week, painful only when she touches it.  Recently ended antibiotics for mastitis on 24.    Chaperone declined.     Mastitis~ 3 weeks ago and finished treatment .  1 week ago, noticed small lump R breast - nontender.  No erythema, chills, myalgias    REVIEW OF SYSTEMS    Please see HPI for reported pertinent positives, which would supersede this ROS    Constitutional:  Denies fever, chills, wt gain or loss, fatigue    Genito-Urinary:  Denies genital lesion or sores, vaginal dryness, itching  or pain.  No abnormal vaginal discharge or unexplained vaginal bleeding.  No dysuria, urinary incontinence or frequency.  Denies pelvic pain, dysmenorrhea or dyspareunia.    Eyes:  Denies vision changes, dryness  ENT:  No hearing loss, sinus pain or congestion, nosebleeds  Cardiovascular:  No chest pain or palpitations  Respiratory:  No SOB, cough, wheezing  GI:  No Nausea, vomiting, diarrhea, constipation, abdominal pain  Musculoskeletal:  No new back pain. joint pain, peripheral edema  Skin:  No rash or skin lesion  Neurologic:  No HA, numbness or dizziness  Psychiatric:  No new anxiety or depression  Endocrine:  No loss of hair or hirsutism  Hematologic/lymphatic:  No swollen lymph nodes.  No reported tendency for easy bruising or bleeding    Patient admits to no other systemic complaints      PHYSICAL EXAM      PSYCH:  Pt is alert, oriented and cooperative    SKIN: warm, dry, w/o lesion    HEAD AND FACE:  External inspection of eyes, ears, functional cranial nerves normal and intact    THYROID:  No thyromegaly    CARDIOVASCULAR:  Warm and well Perfused    PULMONARY:  No respiratory distress    BREAST:  B/l breast engorgement  L br w no mass or LA  R br w 1.5 cm nodule ~ 8:30 approx 1-2 cm from areola    Assessment/Plan    Diagnoses and all orders for this visit:  Mass of right breast, unspecified quadrant -  low suspicion for neoplasm, but will image.  As Br feeding start w Br usn  -     BI US breast limited right; Future

## 2024-07-23 ENCOUNTER — HOSPITAL ENCOUNTER (OUTPATIENT)
Dept: RADIOLOGY | Facility: CLINIC | Age: 29
Discharge: HOME | End: 2024-07-23
Payer: COMMERCIAL

## 2024-07-23 DIAGNOSIS — N63.10 MASS OF RIGHT BREAST, UNSPECIFIED QUADRANT: ICD-10-CM

## 2024-07-23 PROCEDURE — 76642 ULTRASOUND BREAST LIMITED: CPT | Mod: RT

## 2025-04-16 ENCOUNTER — OFFICE VISIT (OUTPATIENT)
Dept: PRIMARY CARE | Facility: CLINIC | Age: 30
End: 2025-04-16
Payer: COMMERCIAL

## 2025-04-16 VITALS
HEIGHT: 61 IN | HEART RATE: 81 BPM | SYSTOLIC BLOOD PRESSURE: 109 MMHG | BODY MASS INDEX: 19.52 KG/M2 | WEIGHT: 103.4 LBS | DIASTOLIC BLOOD PRESSURE: 54 MMHG | TEMPERATURE: 96.8 F

## 2025-04-16 DIAGNOSIS — Z00.00 HEALTHCARE MAINTENANCE: Primary | ICD-10-CM

## 2025-04-16 PROCEDURE — 99395 PREV VISIT EST AGE 18-39: CPT | Performed by: INTERNAL MEDICINE

## 2025-04-16 PROCEDURE — 3008F BODY MASS INDEX DOCD: CPT | Performed by: INTERNAL MEDICINE

## 2025-04-16 PROCEDURE — 1036F TOBACCO NON-USER: CPT | Performed by: INTERNAL MEDICINE

## 2025-04-16 ASSESSMENT — ENCOUNTER SYMPTOMS
FATIGUE: 0
DYSURIA: 0
COUGH: 0
LIGHT-HEADEDNESS: 0
VOMITING: 0
ARTHRALGIAS: 0
DIZZINESS: 0
BACK PAIN: 0
NAUSEA: 0
HEMATURIA: 0
CONFUSION: 0
HEADACHES: 0
DIARRHEA: 0
NERVOUS/ANXIOUS: 0
ABDOMINAL PAIN: 0
SHORTNESS OF BREATH: 0
PALPITATIONS: 0
CONSTIPATION: 0
FEVER: 0
CHILLS: 0

## 2025-04-16 ASSESSMENT — PATIENT HEALTH QUESTIONNAIRE - PHQ9
1. LITTLE INTEREST OR PLEASURE IN DOING THINGS: NOT AT ALL
2. FEELING DOWN, DEPRESSED OR HOPELESS: NOT AT ALL
SUM OF ALL RESPONSES TO PHQ9 QUESTIONS 1 AND 2: 0

## 2025-04-16 NOTE — PROGRESS NOTES
"CHIEF COMPLAINT  Annual Exam    HISTORY OF PRESENT ILLNESS  Avelina Moulton is a 29 y.o. female presents today for follow up of Annual Exam    HPI    Last visit with me April 2022.   Since then, she states she is healthy, no major illnsses or surgeries.  She gave birth to a baby girl 3/24/24, states no complicates with pregnancy.  Feels well today.  Prefers to defer bloodwork this year.      REVIEW OF SYSTEMS  Review of Systems   Constitutional:  Negative for chills, fatigue and fever.   Respiratory:  Negative for cough and shortness of breath.    Cardiovascular:  Negative for chest pain, palpitations and leg swelling.   Gastrointestinal:  Negative for abdominal pain, constipation, diarrhea, nausea and vomiting.   Genitourinary:  Negative for dysuria and hematuria.   Musculoskeletal:  Negative for arthralgias, back pain and gait problem.   Skin:  Negative for rash.   Neurological:  Negative for dizziness, light-headedness and headaches.   Psychiatric/Behavioral:  Negative for confusion. The patient is not nervous/anxious.        ALLERGIES  Patient has no known allergies.    MEDICATIONS  Current Outpatient Medications   Medication Instructions    docusate sodium (COLACE) 100 mg, oral, 2 times daily PRN       TOBACCO USE  Tobacco Use History[1]    DEPRESSION SCREEN  Over the past 2 weeks, how often have you been bothered by any of the following problems?  Little interest or pleasure in doing things: Not at all  Feeling down, depressed, or hopeless: Not at all    SURGICAL HISTORY  Past Surgical History:  No date: WISDOM TOOTH EXTRACTION       OBJECTIVE    /54   Pulse 81   Temp 36 °C (96.8 °F)   Ht 1.549 m (5' 1\")   Wt 46.9 kg (103 lb 6.4 oz)   BMI 19.54 kg/m²    BMI: Estimated body mass index is 19.54 kg/m² as calculated from the following:    Height as of this encounter: 1.549 m (5' 1\").    Weight as of this encounter: 46.9 kg (103 lb 6.4 oz).    BP Readings from Last 3 Encounters:   04/16/25 109/54 "   07/19/24 100/70   05/07/24 110/74      Wt Readings from Last 3 Encounters:   04/16/25 46.9 kg (103 lb 6.4 oz)   07/19/24 49.4 kg (109 lb)   05/07/24 52.2 kg (115 lb)        PHYSICAL EXAM  Physical Exam  Constitutional:       Appearance: Normal appearance.   HENT:      Head: Normocephalic and atraumatic.      Right Ear: Tympanic membrane and ear canal normal.      Left Ear: Tympanic membrane and ear canal normal.   Cardiovascular:      Rate and Rhythm: Normal rate and regular rhythm.      Pulses: Normal pulses.      Heart sounds: No murmur heard.  Pulmonary:      Effort: Pulmonary effort is normal. No respiratory distress.      Breath sounds: Normal breath sounds. No wheezing.   Musculoskeletal:      Right lower leg: No edema.      Left lower leg: No edema.   Skin:     Findings: No rash.   Neurological:      General: No focal deficit present.      Mental Status: She is alert and oriented to person, place, and time. Mental status is at baseline.   Psychiatric:         Mood and Affect: Mood normal.         Behavior: Behavior normal.         Thought Content: Thought content normal.         Judgment: Judgment normal.          ASSESSMENT AND PLAN  Assessment/Plan   Problem List Items Addressed This Visit       Healthcare maintenance - Primary       Well Visit     BP is normal. Healthy habits reviewed and recommended. Aim for 30 minutes of aerobic exercise, 5 times per week. Try to cut back on processed foods, including foods made with flour and sugar. Declines fasting labs this year, consider next year.     Follows with gynecologist for cervical cancer screening.     Reviewed signs of skin cancer and importance of sun protection.      Continue to stay current with routine dental and eye exams.     Flu shot & COVID booster recommended annually in the Fall.    Form completed (proof of well visit ) for insurance and returned to patient.     Follow-up annually       [1]   Social History  Tobacco Use   Smoking Status Never     Passive exposure: Never   Smokeless Tobacco Never

## 2025-04-17 DIAGNOSIS — O91.23 MASTITIS ASSOCIATED WITH LACTATION: Primary | ICD-10-CM

## 2025-04-17 RX ORDER — CEPHALEXIN 500 MG/1
500 CAPSULE ORAL 4 TIMES DAILY
Qty: 28 CAPSULE | Refills: 0 | Status: SHIPPED | OUTPATIENT
Start: 2025-04-17 | End: 2025-04-24

## 2025-04-17 RX ORDER — CEPHALEXIN 500 MG/1
500 CAPSULE ORAL 4 TIMES DAILY
COMMUNITY
Start: 2025-04-17 | End: 2025-04-17 | Stop reason: SDUPTHER

## 2025-04-17 NOTE — PROGRESS NOTES
Pt called through answering service with concerns for mastitis. Has been weaning 12 month old and noticed blocked duct yesterday. Did warm compresses and fed from that breast. Today feeling achy w low grade fever and breast is reddened. Will call in antibiotic and pt to call office to be seen early next week.

## 2025-04-30 ENCOUNTER — APPOINTMENT (OUTPATIENT)
Dept: OBSTETRICS AND GYNECOLOGY | Facility: CLINIC | Age: 30
End: 2025-04-30
Payer: COMMERCIAL

## 2025-04-30 VITALS
SYSTOLIC BLOOD PRESSURE: 110 MMHG | WEIGHT: 106 LBS | BODY MASS INDEX: 20.01 KG/M2 | DIASTOLIC BLOOD PRESSURE: 70 MMHG | HEIGHT: 61 IN

## 2025-04-30 DIAGNOSIS — Z01.419 WELL WOMAN EXAM WITH ROUTINE GYNECOLOGICAL EXAM: Primary | ICD-10-CM

## 2025-04-30 PROCEDURE — 3008F BODY MASS INDEX DOCD: CPT | Performed by: OBSTETRICS & GYNECOLOGY

## 2025-04-30 PROCEDURE — 99395 PREV VISIT EST AGE 18-39: CPT | Performed by: OBSTETRICS & GYNECOLOGY

## 2025-04-30 PROCEDURE — 1036F TOBACCO NON-USER: CPT | Performed by: OBSTETRICS & GYNECOLOGY

## 2025-04-30 NOTE — PROGRESS NOTES
Chief Complaint   Patient presents with    Annual Exam     Annual Exam with Pap Smear        Weaning off of breastfeeding.  No complaints.    Chaperone declined.       For preventative care  Pt w hx/o NF  13 mo old    REVIEW OF SYSTEMS    Please see HPI for reported pertinent positives, which would supersede this ROS    Constitutional:  Denies fever, chills, wt gain or loss, fatigue    Genito-Urinary:  Denies genital lesion or sores, vaginal dryness, itching  or pain.  No abnormal vaginal discharge or unexplained vaginal bleeding.  No dysuria, urinary incontinence or frequency.  Denies pelvic pain, dysmenorrhea or dyspareunia.    Eyes:  Denies vision changes, dryness  ENT:  No hearing loss, sinus pain or congestion, nosebleeds  Cardiovascular:  No chest pain or palpitations  Respiratory:  No SOB, cough, wheezing  GI:  No Nausea, vomiting, diarrhea, constipation, abdominal pain  Musculoskeletal:  No new back pain. joint pain, peripheral edema  Skin:  No rash or skin lesion  Neurologic:  No HA, numbness or dizziness  Psychiatric:  No new anxiety or depression  Endocrine:  No loss of hair or hirsutism  Hematologic/lymphatic:  No swollen lymph nodes.  No reported tendency for easy bruising or bleeding    Patient admits to no other systemic complaints      Vitals:    25 1012   BP: 110/70       PHYSICAL EXAM:    PSYCH:  Pt is alert, oriented and cooperative    SKIN: warm, dry, w/o lesion    HEAD AND FACE:  External inspection of eyes, ears, functional cranial nerves normal and intact    THYROID:  No thyromegaly    CARDIOVASCULAR:  Warm and well Perfused    PULMONARY:  No respiratory distress    ABDOMEN:  soft, nontender.  No mass or organomegaly.      BREAST:  Breasts are symmetric to inspection and palpation.  No mass palpable bilaterally.  There is no axillary lymphadenopathy    PELVIC:    External genitalia, urethra, perianal region normal to inspection and palpation if indicated.  No inguinal LA    Vagina  without lesions.    Cervix seen and visually normal      Bimanual exam:      No pelvic mass palpable    Uterus nontender, midline in pelvis    No adnexal masses or tenderness    Assessment/Plan    Diagnoses and all orders for this visit:  Well woman exam with routine gynecological exam

## 2025-05-20 LAB
CYTOLOGY CMNT CVX/VAG CYTO-IMP: NORMAL
LAB AP HPV GENOTYPE QUESTION: YES
LAB AP HPV HR: NORMAL
LABORATORY COMMENT REPORT: NORMAL
LMP START DATE: NORMAL
PATH REPORT.TOTAL CANCER: NORMAL

## 2025-08-22 ENCOUNTER — TRANSCRIBE ORDERS (OUTPATIENT)
Dept: OBSTETRICS AND GYNECOLOGY | Facility: CLINIC | Age: 30
End: 2025-08-22
Payer: COMMERCIAL

## 2025-08-22 DIAGNOSIS — N91.1 SECONDARY AMENORRHEA: Primary | ICD-10-CM

## 2025-09-03 ENCOUNTER — TELEPHONE (OUTPATIENT)
Dept: OBSTETRICS AND GYNECOLOGY | Facility: CLINIC | Age: 30
End: 2025-09-03
Payer: COMMERCIAL

## 2025-09-04 DIAGNOSIS — O20.0 THREATENED ABORTION, ANTEPARTUM CONDITION OR COMPLICATION (HHS-HCC): Primary | ICD-10-CM

## 2025-09-05 LAB — B-HCG SERPL-ACNC: ABNORMAL MIU/ML

## 2025-09-24 ENCOUNTER — APPOINTMENT (OUTPATIENT)
Dept: OBSTETRICS AND GYNECOLOGY | Facility: CLINIC | Age: 30
End: 2025-09-24
Payer: COMMERCIAL

## 2025-09-24 ENCOUNTER — APPOINTMENT (OUTPATIENT)
Dept: RADIOLOGY | Facility: CLINIC | Age: 30
End: 2025-09-24
Payer: COMMERCIAL